# Patient Record
Sex: FEMALE | Race: WHITE | NOT HISPANIC OR LATINO | URBAN - METROPOLITAN AREA
[De-identification: names, ages, dates, MRNs, and addresses within clinical notes are randomized per-mention and may not be internally consistent; named-entity substitution may affect disease eponyms.]

---

## 2017-02-28 ENCOUNTER — OUTPATIENT (OUTPATIENT)
Dept: OUTPATIENT SERVICES | Facility: HOSPITAL | Age: 62
LOS: 1 days | End: 2017-02-28
Payer: COMMERCIAL

## 2017-02-28 VITALS
DIASTOLIC BLOOD PRESSURE: 80 MMHG | HEART RATE: 76 BPM | RESPIRATION RATE: 18 BRPM | HEIGHT: 60 IN | TEMPERATURE: 99 F | WEIGHT: 154.1 LBS | SYSTOLIC BLOOD PRESSURE: 130 MMHG

## 2017-02-28 DIAGNOSIS — N95.0 POSTMENOPAUSAL BLEEDING: ICD-10-CM

## 2017-02-28 DIAGNOSIS — S60.319A ABRASION OF UNSPECIFIED THUMB, INITIAL ENCOUNTER: Chronic | ICD-10-CM

## 2017-02-28 LAB
BUN SERPL-MCNC: 16 MG/DL — SIGNIFICANT CHANGE UP (ref 7–23)
CALCIUM SERPL-MCNC: 9.7 MG/DL — SIGNIFICANT CHANGE UP (ref 8.4–10.5)
CHLORIDE SERPL-SCNC: 106 MMOL/L — SIGNIFICANT CHANGE UP (ref 98–107)
CO2 SERPL-SCNC: 23 MMOL/L — SIGNIFICANT CHANGE UP (ref 22–31)
CREAT SERPL-MCNC: 0.71 MG/DL — SIGNIFICANT CHANGE UP (ref 0.5–1.3)
GLUCOSE SERPL-MCNC: 74 MG/DL — SIGNIFICANT CHANGE UP (ref 70–99)
HCT VFR BLD CALC: 43.8 % — SIGNIFICANT CHANGE UP (ref 34.5–45)
HGB BLD-MCNC: 14.7 G/DL — SIGNIFICANT CHANGE UP (ref 11.5–15.5)
MCHC RBC-ENTMCNC: 28.6 PG — SIGNIFICANT CHANGE UP (ref 27–34)
MCHC RBC-ENTMCNC: 33.6 % — SIGNIFICANT CHANGE UP (ref 32–36)
MCV RBC AUTO: 85.2 FL — SIGNIFICANT CHANGE UP (ref 80–100)
PLATELET # BLD AUTO: 310 K/UL — SIGNIFICANT CHANGE UP (ref 150–400)
PMV BLD: 9.5 FL — SIGNIFICANT CHANGE UP (ref 7–13)
POTASSIUM SERPL-MCNC: 4.2 MMOL/L — SIGNIFICANT CHANGE UP (ref 3.5–5.3)
POTASSIUM SERPL-SCNC: 4.2 MMOL/L — SIGNIFICANT CHANGE UP (ref 3.5–5.3)
RBC # BLD: 5.14 M/UL — SIGNIFICANT CHANGE UP (ref 3.8–5.2)
RBC # FLD: 14.1 % — SIGNIFICANT CHANGE UP (ref 10.3–14.5)
SODIUM SERPL-SCNC: 144 MMOL/L — SIGNIFICANT CHANGE UP (ref 135–145)
WBC # BLD: 13.33 K/UL — HIGH (ref 3.8–10.5)
WBC # FLD AUTO: 13.33 K/UL — HIGH (ref 3.8–10.5)

## 2017-02-28 PROCEDURE — 93010 ELECTROCARDIOGRAM REPORT: CPT

## 2017-02-28 NOTE — H&P PST ADULT - ASSESSMENT
62 yrs old female with thickened endometrial lining  and postmenopausal bleeding presents for preop eval to have D&C on 3/3/17

## 2017-02-28 NOTE — H&P PST ADULT - PMH
Endometrial hyperplasia    IBS (irritable bowel syndrome)    Obesity (BMI 30-39.9)    Postmenopausal bleeding Endometrial hyperplasia    IBS (irritable bowel syndrome)    Obesity (BMI 30-39.9)    Postmenopausal bleeding    UTI (urinary tract infection)  Jan 2017

## 2017-02-28 NOTE — H&P PST ADULT - HISTORY OF PRESENT ILLNESS
62 yrs old female with h/o menopause in her 50's presents for preop eval to have D&C hysteroscopy on 3/3/17. Pt stated that she had abdominal soogram that sdhowed tickened endometrial lining and so also had vaginal sonogram. Pt also had postmenopausal bleeding. 62 yrs old female with h/o menopause in her 50's presents for preop eval to have D&C hysteroscopy on 3/3/17. Pt stated that she had abdominal soogram that showed thickened endometrial lining and so also had vaginal sonogram. Pt also had postmenopausal bleeding. D&C was recommended by her Gyn.

## 2017-02-28 NOTE — H&P PST ADULT - NEGATIVE NEUROLOGICAL SYMPTOMS
no tremors/no weakness/no loss of sensation/no facial palsy/no hemiparesis/no vertigo/no transient paralysis

## 2017-02-28 NOTE — H&P PST ADULT - NEGATIVE ENMT SYMPTOMS
no vertigo/no dry mouth/no ear pain/no nose bleeds/no post-nasal discharge/no hearing difficulty/no nasal discharge/no gum bleeding/no dysphagia/no nasal obstruction/no throat pain

## 2017-02-28 NOTE — H&P PST ADULT - NSANTHOSAYNRD_GEN_A_CORE
never tested/No. ALEXANDER screening performed.  STOP BANG Legend: 0-2 = LOW Risk; 3-4 = INTERMEDIATE Risk; 5-8 = HIGH Risk

## 2017-03-02 ENCOUNTER — RESULT REVIEW (OUTPATIENT)
Age: 62
End: 2017-03-02

## 2017-03-03 ENCOUNTER — OUTPATIENT (OUTPATIENT)
Dept: OUTPATIENT SERVICES | Facility: HOSPITAL | Age: 62
LOS: 1 days | Discharge: HOME | End: 2017-03-03
Payer: COMMERCIAL

## 2017-03-03 VITALS
WEIGHT: 154.1 LBS | RESPIRATION RATE: 14 BRPM | TEMPERATURE: 98 F | HEIGHT: 60 IN | SYSTOLIC BLOOD PRESSURE: 141 MMHG | DIASTOLIC BLOOD PRESSURE: 82 MMHG | OXYGEN SATURATION: 98 % | HEART RATE: 78 BPM

## 2017-03-03 VITALS
TEMPERATURE: 98 F | RESPIRATION RATE: 15 BRPM | SYSTOLIC BLOOD PRESSURE: 159 MMHG | HEART RATE: 61 BPM | OXYGEN SATURATION: 98 % | DIASTOLIC BLOOD PRESSURE: 90 MMHG

## 2017-03-03 DIAGNOSIS — S60.319A ABRASION OF UNSPECIFIED THUMB, INITIAL ENCOUNTER: Chronic | ICD-10-CM

## 2017-03-03 DIAGNOSIS — N95.0 POSTMENOPAUSAL BLEEDING: ICD-10-CM

## 2017-03-03 PROCEDURE — 88305 TISSUE EXAM BY PATHOLOGIST: CPT | Mod: 26

## 2017-03-03 RX ORDER — SODIUM CHLORIDE 9 MG/ML
1000 INJECTION, SOLUTION INTRAVENOUS
Qty: 0 | Refills: 0 | Status: DISCONTINUED | OUTPATIENT
Start: 2017-03-03 | End: 2017-03-04

## 2017-03-03 RX ORDER — CHOLECALCIFEROL (VITAMIN D3) 125 MCG
1 CAPSULE ORAL
Qty: 0 | Refills: 0 | COMMUNITY

## 2017-03-03 RX ORDER — LUTEIN 20 MG
1 CAPSULE ORAL
Qty: 0 | Refills: 0 | COMMUNITY

## 2017-03-03 NOTE — ASU DISCHARGE PLAN (ADULT/PEDIATRIC). - ACTIVITY LEVEL
no tub baths/no intercourse/no douching/2 weeks/nothing per vagina/no tampons no heavy lifting/no douching/no tampons/no intercourse/nothing per vagina/2 weeks/no tub baths

## 2017-03-03 NOTE — ASU DISCHARGE PLAN (ADULT/PEDIATRIC). - NURSING INSTRUCTIONS
You received intravenous tylenol in the operating room at 11:30am. You may take additional tylenol products after 5:30pm. You also received intravenous toradol (NSAID) in the operating room at 11:45am. You may take additional NSAIDs (advil/aleve/ibuprofen/motrin) after 5:45pm.

## 2017-03-03 NOTE — ASU DISCHARGE PLAN (ADULT/PEDIATRIC). - NOTIFY
Bleeding that does not stop/GYN Fever>100.4 Persistent Nausea and Vomiting/Bleeding that does not stop/Unable to Urinate/GYN Fever>100.4/Pain not relieved by Medications

## 2017-03-04 ENCOUNTER — TRANSCRIPTION ENCOUNTER (OUTPATIENT)
Age: 62
End: 2017-03-04

## 2017-03-07 LAB — SURGICAL PATHOLOGY STUDY: SIGNIFICANT CHANGE UP

## 2018-01-22 NOTE — ASU PREOP CHECKLIST - TYPE OF SOLUTION
Have Your Spot(S) Been Treated In The Past?: has not been treated Hpi Title: Evaluation of Skin Lesions lr 1000

## 2021-05-20 PROBLEM — N85.00 ENDOMETRIAL HYPERPLASIA, UNSPECIFIED: Chronic | Status: ACTIVE | Noted: 2017-02-28

## 2021-05-20 PROBLEM — E66.9 OBESITY, UNSPECIFIED: Chronic | Status: ACTIVE | Noted: 2017-02-28

## 2021-05-20 PROBLEM — N95.0 POSTMENOPAUSAL BLEEDING: Chronic | Status: ACTIVE | Noted: 2017-02-28

## 2021-05-20 PROBLEM — N39.0 URINARY TRACT INFECTION, SITE NOT SPECIFIED: Chronic | Status: ACTIVE | Noted: 2017-02-28

## 2021-05-20 PROBLEM — K58.9 IRRITABLE BOWEL SYNDROME WITHOUT DIARRHEA: Chronic | Status: ACTIVE | Noted: 2017-02-28

## 2021-06-08 ENCOUNTER — APPOINTMENT (OUTPATIENT)
Dept: GASTROENTEROLOGY | Facility: CLINIC | Age: 66
End: 2021-06-08
Payer: MEDICARE

## 2021-06-08 VITALS
BODY MASS INDEX: 27.48 KG/M2 | SYSTOLIC BLOOD PRESSURE: 140 MMHG | DIASTOLIC BLOOD PRESSURE: 80 MMHG | WEIGHT: 140 LBS | TEMPERATURE: 97.9 F | HEIGHT: 60 IN | HEART RATE: 75 BPM | OXYGEN SATURATION: 99 %

## 2021-06-08 DIAGNOSIS — R19.8 OTHER SPECIFIED SYMPTOMS AND SIGNS INVOLVING THE DIGESTIVE SYSTEM AND ABDOMEN: ICD-10-CM

## 2021-06-08 PROCEDURE — 99203 OFFICE O/P NEW LOW 30 MIN: CPT

## 2021-06-08 NOTE — HISTORY OF PRESENT ILLNESS
[FreeTextEntry1] : \par Last here 9 years ago for a colonosocpy\par \par c/o "IBS" - has noted a change in bowel habits - more frequent BMs over the last 3-4 weeks; not loose,no BRB\par \par Also - has been on antibiotics for UTI over the last month (now on her 3rd course); also had a CT, showing a fatty liver\par

## 2021-06-08 NOTE — ASSESSMENT
[FreeTextEntry1] : \par Recent change in bowel habits while on abx\par \par Can take probiotics for antibiotic associated diarrhea\par Given last colonoscopy 9 years ago, reasonable to screen\par \par Discussed risks of the procedure -  including but not limited to bleeding, infection, drug reaction, perforation and missed lesion.  Also discussed benefits and alternatives of this procedure with patient - including no treatment  - and the patient consents to undergoing the procedure.\par \par \par

## 2021-06-08 NOTE — PHYSICAL EXAM
[Neck Appearance] : the appearance of the neck was normal [Neck Cervical Mass (___cm)] : no neck mass was observed [Jugular Venous Distention Increased] : there was no jugular-venous distention [Thyroid Diffuse Enlargement] : the thyroid was not enlarged [Thyroid Nodule] : there were no palpable thyroid nodules [Heart Rate And Rhythm] : heart rate was normal and rhythm regular [Heart Sounds] : normal S1 and S2 [Murmurs] : no murmurs [Heart Sounds Gallop] : no gallops [Heart Sounds Pericardial Friction Rub] : no pericardial rub [Bowel Sounds] : normal bowel sounds [Abdomen Soft] : soft [Abdomen Tenderness] : non-tender [] : no hepato-splenomegaly [Abdomen Mass (___ Cm)] : no abdominal mass palpated

## 2021-07-17 NOTE — ASU PREOP CHECKLIST - SITE MARKED BY ANESTHESIOLOGIST
n/a [Difficulty with Sleep] : difficulty with sleep [Nasal Discharge] : nasal discharge [Nasal Congestion] : nasal congestion [Sore Throat] : sore throat [Negative] : Genitourinary

## 2022-01-15 NOTE — H&P PST ADULT - NS PRO FEM REPRO BREAST EXAM FREQ
Please follow-up at our [facility] at the number listed above upon discharge from [facility].  While at [facility], have you INR checked weekly and your coumadin dose adjusted accordingly. monthly

## 2023-08-31 NOTE — H&P PST ADULT - HEIGHT IN INCHES
Pt presents with low sodium. Pt was seen by the infusion center today and was told her sodium was low but they had no orders to give her anything to treat it. Pt denies any symptoms. 0